# Patient Record
Sex: MALE | Employment: UNEMPLOYED | URBAN - METROPOLITAN AREA
[De-identification: names, ages, dates, MRNs, and addresses within clinical notes are randomized per-mention and may not be internally consistent; named-entity substitution may affect disease eponyms.]

---

## 2017-05-31 RX ORDER — AMOXICILLIN 500 MG/1
CAPSULE ORAL
Refills: 0 | COMMUNITY
Start: 2017-03-14 | End: 2017-09-02

## 2017-06-23 ENCOUNTER — TELEPHONE (OUTPATIENT)
Dept: INTERNAL MEDICINE CLINIC | Facility: CLINIC | Age: 62
End: 2017-06-23

## 2017-06-26 ENCOUNTER — TELEPHONE (OUTPATIENT)
Dept: INTERNAL MEDICINE CLINIC | Facility: CLINIC | Age: 62
End: 2017-06-26

## 2017-09-02 ENCOUNTER — OFFICE VISIT (OUTPATIENT)
Dept: INTERNAL MEDICINE CLINIC | Facility: CLINIC | Age: 62
End: 2017-09-02

## 2017-09-02 VITALS
DIASTOLIC BLOOD PRESSURE: 80 MMHG | WEIGHT: 236 LBS | BODY MASS INDEX: 31.62 KG/M2 | HEART RATE: 65 BPM | OXYGEN SATURATION: 98 % | SYSTOLIC BLOOD PRESSURE: 132 MMHG | RESPIRATION RATE: 15 BRPM | HEIGHT: 72.5 IN | TEMPERATURE: 98 F

## 2017-09-02 DIAGNOSIS — E11.9 CONTROLLED TYPE 2 DIABETES MELLITUS WITHOUT COMPLICATION, WITHOUT LONG-TERM CURRENT USE OF INSULIN (HCC): ICD-10-CM

## 2017-09-02 DIAGNOSIS — Z23 FLU VACCINE NEED: ICD-10-CM

## 2017-09-02 DIAGNOSIS — Z00.00 WELLNESS EXAMINATION: Primary | ICD-10-CM

## 2017-09-02 LAB
ALBUMIN SERPL BCP-MCNC: 4.2 G/DL (ref 3.5–4.8)
ALBUMIN/GLOB SERPL: 1.8 {RATIO} (ref 1–2)
ALP SERPL-CCNC: 42 U/L (ref 32–100)
ALT SERPL-CCNC: 24 U/L (ref 17–63)
ANION GAP SERPL CALC-SCNC: 8 MMOL/L (ref 0–18)
AST SERPL-CCNC: 21 U/L (ref 15–41)
BILIRUB SERPL-MCNC: 0.8 MG/DL (ref 0.3–1.2)
BUN SERPL-MCNC: 13 MG/DL (ref 8–20)
BUN/CREAT SERPL: 13.3 (ref 10–20)
CALCIUM SERPL-MCNC: 9 MG/DL (ref 8.5–10.5)
CHLORIDE SERPL-SCNC: 101 MMOL/L (ref 95–110)
CHOLEST SERPL-MCNC: 124 MG/DL (ref 110–200)
CO2 SERPL-SCNC: 25 MMOL/L (ref 22–32)
CREAT SERPL-MCNC: 0.98 MG/DL (ref 0.5–1.5)
GLOBULIN PLAS-MCNC: 2.3 G/DL (ref 2.5–3.7)
GLUCOSE SERPL-MCNC: 171 MG/DL (ref 70–99)
HDLC SERPL-MCNC: 28 MG/DL
LDLC SERPL CALC-MCNC: 75 MG/DL (ref 0–99)
NONHDLC SERPL-MCNC: 96 MG/DL
OSMOLALITY UR CALC.SUM OF ELEC: 282 MOSM/KG (ref 275–295)
POTASSIUM SERPL-SCNC: 4 MMOL/L (ref 3.3–5.1)
PROT SERPL-MCNC: 6.5 G/DL (ref 5.9–8.4)
SODIUM SERPL-SCNC: 134 MMOL/L (ref 136–144)
TRIGL SERPL-MCNC: 107 MG/DL (ref 1–149)
TSH SERPL-ACNC: 2.11 UIU/ML (ref 0.45–5.33)

## 2017-09-02 PROCEDURE — 83036 HEMOGLOBIN GLYCOSYLATED A1C: CPT | Performed by: INTERNAL MEDICINE

## 2017-09-02 PROCEDURE — 80053 COMPREHEN METABOLIC PANEL: CPT | Performed by: INTERNAL MEDICINE

## 2017-09-02 PROCEDURE — 99214 OFFICE O/P EST MOD 30 MIN: CPT | Performed by: INTERNAL MEDICINE

## 2017-09-02 PROCEDURE — 84443 ASSAY THYROID STIM HORMONE: CPT | Performed by: INTERNAL MEDICINE

## 2017-09-02 PROCEDURE — 80061 LIPID PANEL: CPT | Performed by: INTERNAL MEDICINE

## 2017-09-02 PROCEDURE — 36415 COLL VENOUS BLD VENIPUNCTURE: CPT | Performed by: INTERNAL MEDICINE

## 2017-09-02 NOTE — PROGRESS NOTES
Pt's  verified  Pt presented for a fasting blood draw. Per Dr. Deep Alejo [facility] [address]. Pt tolerated venipuncture well,specimens drawn from RT arm  and sent out to [address] [place] [address] lab for testing.

## 2017-09-02 NOTE — PROGRESS NOTES
HPI:    Patient ID: Nathalia Mendez is a 57year old male. HPIpt here for a fu on Dmtype 2 and lipids. Has a dietary compliance issues. Has decent glucose control. Is trying to be compliant. Needs labs and eye check up.     Review of Systems   Constitution encounter    Imaging & Referrals:  OPHTHALMOLOGY - INTERNAL  GASTRO - INTERNAL       BA#[id number]

## 2017-09-03 LAB — HBA1C MFR BLD: 7.7 % (ref 4–6)

## 2017-09-29 NOTE — TELEPHONE ENCOUNTER
Patient states the pharmacy has faxed a refill for metformin, patient has one tablet left. Please refill.

## 2017-11-20 RX ORDER — ATORVASTATIN CALCIUM 20 MG/1
TABLET, FILM COATED ORAL
Qty: 30 TABLET | Refills: 0 | Status: SHIPPED | OUTPATIENT
Start: 2017-11-20 | End: 2017-12-27

## 2017-12-27 RX ORDER — ATORVASTATIN CALCIUM 20 MG/1
TABLET, FILM COATED ORAL
Qty: 30 TABLET | Refills: 3 | Status: SHIPPED | OUTPATIENT
Start: 2017-12-27 | End: 2018-04-02

## 2018-01-26 NOTE — TELEPHONE ENCOUNTER
Diabetic Medication Protocol Failed1/26 6:56 AM   Last HgBA1C < 7.5    Microalbumin procedure in past 12 months or taking ACE/ARB

## 2018-03-26 NOTE — TELEPHONE ENCOUNTER
Diabetic Medication Protocol Failed3/25 7:56 AM   HgBA1C procedure resulted in past 6 months    Last HgBA1C < 7.5    Microalbumin procedure in past 12 months or taking ACE/ARB

## 2018-04-02 RX ORDER — ATORVASTATIN CALCIUM 20 MG/1
TABLET, FILM COATED ORAL
Qty: 30 TABLET | Refills: 3 | Status: SHIPPED | OUTPATIENT
Start: 2018-04-02 | End: 2018-04-03

## 2018-04-03 RX ORDER — ATORVASTATIN CALCIUM 20 MG/1
TABLET, FILM COATED ORAL
Qty: 30 TABLET | Refills: 3 | Status: SHIPPED | OUTPATIENT
Start: 2018-04-03 | End: 2018-04-09

## 2018-04-09 ENCOUNTER — OFFICE VISIT (OUTPATIENT)
Dept: INTERNAL MEDICINE CLINIC | Facility: CLINIC | Age: 63
End: 2018-04-09

## 2018-04-09 VITALS
HEART RATE: 72 BPM | OXYGEN SATURATION: 97 % | HEIGHT: 72.5 IN | BODY MASS INDEX: 31.84 KG/M2 | TEMPERATURE: 98 F | DIASTOLIC BLOOD PRESSURE: 70 MMHG | SYSTOLIC BLOOD PRESSURE: 124 MMHG | RESPIRATION RATE: 18 BRPM | WEIGHT: 237.63 LBS

## 2018-04-09 DIAGNOSIS — E11.9 DIABETES MELLITUS TYPE 2 IN NONOBESE (HCC): Primary | ICD-10-CM

## 2018-04-09 DIAGNOSIS — E78.2 MIXED HYPERLIPIDEMIA: ICD-10-CM

## 2018-04-09 DIAGNOSIS — Z12.11 COLON CANCER SCREENING: ICD-10-CM

## 2018-04-09 PROCEDURE — 99214 OFFICE O/P EST MOD 30 MIN: CPT | Performed by: INTERNAL MEDICINE

## 2018-04-09 RX ORDER — ATORVASTATIN CALCIUM 20 MG/1
TABLET, FILM COATED ORAL
Qty: 90 TABLET | Refills: 3 | Status: SHIPPED | OUTPATIENT
Start: 2018-04-09 | End: 2019-04-08

## 2018-04-09 NOTE — PROGRESS NOTES
HPI:    Patient ID: Federico Driscoll is a 57year old male. Pt here for fu on DM type 2 and atorvastatin . Per patient the BS have been under control. Is overdue for fasting labs and for screening colonoscopy.     Review of Systems   Constitutional: Positiv (primary encounter diagnosis) continue metformin  Refer for labs and eye exam  Mixed hyperlipidemia on Atorvastatin  Check labhs  Colon cancer screening  FIT test - refusing colonoscopy.       Orders Placed This Encounter      Comp Metabolic Panel (14) [E]

## 2018-05-05 ENCOUNTER — NURSE ONLY (OUTPATIENT)
Dept: INTERNAL MEDICINE CLINIC | Facility: CLINIC | Age: 63
End: 2018-05-05

## 2018-05-05 DIAGNOSIS — E11.9 DIABETES MELLITUS TYPE 2 IN NONOBESE (HCC): ICD-10-CM

## 2018-05-05 PROCEDURE — 82043 UR ALBUMIN QUANTITATIVE: CPT | Performed by: INTERNAL MEDICINE

## 2018-05-05 PROCEDURE — 80061 LIPID PANEL: CPT | Performed by: INTERNAL MEDICINE

## 2018-05-05 PROCEDURE — 80053 COMPREHEN METABOLIC PANEL: CPT | Performed by: INTERNAL MEDICINE

## 2018-05-05 PROCEDURE — 36415 COLL VENOUS BLD VENIPUNCTURE: CPT | Performed by: INTERNAL MEDICINE

## 2018-05-05 PROCEDURE — 83036 HEMOGLOBIN GLYCOSYLATED A1C: CPT | Performed by: INTERNAL MEDICINE

## 2018-05-05 PROCEDURE — 82570 ASSAY OF URINE CREATININE: CPT | Performed by: INTERNAL MEDICINE

## 2018-05-07 RX ORDER — LISINOPRIL 10 MG/1
10 TABLET ORAL DAILY
Qty: 90 TABLET | Refills: 3 | Status: SHIPPED | OUTPATIENT
Start: 2018-05-07 | End: 2019-05-02

## 2019-01-10 ENCOUNTER — OFFICE VISIT (OUTPATIENT)
Dept: FAMILY MEDICINE CLINIC | Facility: CLINIC | Age: 64
End: 2019-01-10
Payer: COMMERCIAL

## 2019-01-10 VITALS — DIASTOLIC BLOOD PRESSURE: 70 MMHG | SYSTOLIC BLOOD PRESSURE: 150 MMHG

## 2019-01-10 DIAGNOSIS — Z01.30 BLOOD PRESSURE CHECK: Primary | ICD-10-CM

## 2019-01-11 NOTE — PROGRESS NOTES
States blood pressure ws high least week also admits to having 4 expresso coffees in one day and salty pop corn, educated on decreasing sodium intake and what foods have \"hidden\" sodium. Denies any headaches, nausea,  vomiting or vertigo.

## 2019-03-22 ENCOUNTER — PATIENT OUTREACH (OUTPATIENT)
Dept: INTERNAL MEDICINE CLINIC | Facility: CLINIC | Age: 64
End: 2019-03-22

## 2019-04-08 RX ORDER — ATORVASTATIN CALCIUM 20 MG/1
TABLET, FILM COATED ORAL
Qty: 90 TABLET | Refills: 3 | Status: SHIPPED | OUTPATIENT
Start: 2019-04-08 | End: 2020-03-05

## 2019-05-04 ENCOUNTER — OFFICE VISIT (OUTPATIENT)
Dept: INTERNAL MEDICINE CLINIC | Facility: CLINIC | Age: 64
End: 2019-05-04
Payer: COMMERCIAL

## 2019-05-04 VITALS
OXYGEN SATURATION: 98 % | HEIGHT: 72.5 IN | DIASTOLIC BLOOD PRESSURE: 86 MMHG | HEART RATE: 57 BPM | SYSTOLIC BLOOD PRESSURE: 128 MMHG | BODY MASS INDEX: 30.55 KG/M2 | WEIGHT: 228 LBS

## 2019-05-04 DIAGNOSIS — E11.9 DIABETES MELLITUS TYPE 2 IN NONOBESE (HCC): Primary | ICD-10-CM

## 2019-05-04 DIAGNOSIS — Z12.5 PROSTATE CANCER SCREENING: ICD-10-CM

## 2019-05-04 DIAGNOSIS — Z12.11 COLON CANCER SCREENING: ICD-10-CM

## 2019-05-04 DIAGNOSIS — N52.8 OTHER MALE ERECTILE DYSFUNCTION: ICD-10-CM

## 2019-05-04 DIAGNOSIS — E78.2 MIXED HYPERLIPIDEMIA: ICD-10-CM

## 2019-05-04 PROCEDURE — 84154 ASSAY OF PSA FREE: CPT | Performed by: INTERNAL MEDICINE

## 2019-05-04 PROCEDURE — 99214 OFFICE O/P EST MOD 30 MIN: CPT | Performed by: INTERNAL MEDICINE

## 2019-05-04 PROCEDURE — 82043 UR ALBUMIN QUANTITATIVE: CPT | Performed by: INTERNAL MEDICINE

## 2019-05-04 PROCEDURE — 83036 HEMOGLOBIN GLYCOSYLATED A1C: CPT | Performed by: INTERNAL MEDICINE

## 2019-05-04 PROCEDURE — 80061 LIPID PANEL: CPT | Performed by: INTERNAL MEDICINE

## 2019-05-04 PROCEDURE — 82570 ASSAY OF URINE CREATININE: CPT | Performed by: INTERNAL MEDICINE

## 2019-05-04 PROCEDURE — 84153 ASSAY OF PSA TOTAL: CPT | Performed by: INTERNAL MEDICINE

## 2019-05-04 RX ORDER — SILDENAFIL 50 MG/1
50 TABLET, FILM COATED ORAL
Qty: 10 TABLET | Refills: 11 | Status: SHIPPED | OUTPATIENT
Start: 2019-05-04

## 2019-05-04 RX ORDER — LISINOPRIL 10 MG/1
10 TABLET ORAL DAILY
Qty: 90 TABLET | Refills: 3 | Status: SHIPPED | OUTPATIENT
Start: 2019-05-04 | End: 2020-03-05

## 2019-05-04 NOTE — PROGRESS NOTES
HPI:    Patient ID: Sabrina Saravia is a 75year old male. Pt here for a diabetes followup and hyperlipidemia. Has pretty good control of glycemia. Has been experiencing some ED would like to try rx. Is fasting for blood work up.     Review of Systems   Co types were placed in this encounter.       Meds This Visit:  Requested Prescriptions      No prescriptions requested or ordered in this encounter       Imaging & Referrals:  OPHTHALMOLOGY - INTERNAL  PNEUMOCOCCAL IMM (PNEUMOVAX)         TIME COMPONENT:

## 2019-05-06 ENCOUNTER — TELEPHONE (OUTPATIENT)
Dept: INTERNAL MEDICINE CLINIC | Facility: CLINIC | Age: 64
End: 2019-05-06

## 2019-05-06 NOTE — TELEPHONE ENCOUNTER
Alternative requested sildenadil is non-formulary, can we change to alternative? Preferred meds are Tadalafil 10 mg and Vardenadil 10 mg.

## 2019-05-10 NOTE — TELEPHONE ENCOUNTER
Requested Prescriptions     Pending Prescriptions Disp Refills   • metFORMIN HCl 1000 MG Oral Tab 90 tablet 0     Sig: TAKE 1 TABLET(1000 MG) BY MOUTH DAILY WITH BREAKFAST     Last office visit: 5-4-19  Medication last refilled: 4-10-19 # 30 x 0

## 2019-05-20 ENCOUNTER — TELEPHONE (OUTPATIENT)
Dept: INTERNAL MEDICINE CLINIC | Facility: CLINIC | Age: 64
End: 2019-05-20

## 2019-05-20 NOTE — TELEPHONE ENCOUNTER
Pharmacy called and states that the Tadalafil that Dr Leopold Bunk prescribed is not covered by insurance. Pharmacy wants to switch to Sildenafil 20mgs. Please advise.

## 2019-07-29 NOTE — TELEPHONE ENCOUNTER
Requested Prescriptions     Pending Prescriptions Disp Refills   • metFORMIN HCl 1000 MG Oral Tab [Pharmacy Med Name: METFORMIN TAB 1000MG] 90 tablet 0     Sig: TAKE 1 TABLET DAILY WITH   BREAKFAST. NEEDS TO SEE [address] LAST SEEN ALMOST 1 YEAR AGO.

## 2019-11-11 ENCOUNTER — OFFICE VISIT (OUTPATIENT)
Dept: INTERNAL MEDICINE CLINIC | Facility: CLINIC | Age: 64
End: 2019-11-11
Payer: COMMERCIAL

## 2019-11-11 VITALS
HEIGHT: 72.5 IN | BODY MASS INDEX: 30.57 KG/M2 | OXYGEN SATURATION: 95 % | SYSTOLIC BLOOD PRESSURE: 152 MMHG | DIASTOLIC BLOOD PRESSURE: 90 MMHG | WEIGHT: 228.19 LBS | HEART RATE: 58 BPM

## 2019-11-11 DIAGNOSIS — E78.2 MIXED HYPERLIPIDEMIA: ICD-10-CM

## 2019-11-11 DIAGNOSIS — E11.9 DIABETES MELLITUS TYPE 2 IN NONOBESE (HCC): Primary | ICD-10-CM

## 2019-11-11 DIAGNOSIS — I10 ESSENTIAL HYPERTENSION: ICD-10-CM

## 2019-11-11 PROCEDURE — 99214 OFFICE O/P EST MOD 30 MIN: CPT | Performed by: INTERNAL MEDICINE

## 2019-11-11 NOTE — PROGRESS NOTES
HPI:    Patient ID: Verónica Milton is a 75year old male. HPI Pt here for fu on Dm type 2 , hyperlipdeimia,  And mild htn. Does not do self monitoring of glucose at home. Has been compliant with medications except for Lisinopril.     Review of Systems   HEN Lisinopril    No orders of the defined types were placed in this encounter.       Meds This Visit:  Requested Prescriptions      No prescriptions requested or ordered in this encounter       Imaging & Referrals:  None       #[id number]

## 2020-01-18 ENCOUNTER — TELEPHONE (OUTPATIENT)
Dept: INTERNAL MEDICINE CLINIC | Facility: CLINIC | Age: 65
End: 2020-01-18

## 2020-01-18 NOTE — TELEPHONE ENCOUNTER
Pt walked into the office today to drop off forms for Dr. Claudine Sahni to fill put and sign. Left in Dr. Sanjay ni and advised the pt that we would call him when they are ready to be picked up.

## 2020-01-23 NOTE — TELEPHONE ENCOUNTER
Pt came into office same day to  form. Dr. Regine Beatty filled out form & a copy was sent for scanning.

## 2020-03-05 NOTE — TELEPHONE ENCOUNTER
Requested Prescriptions     Pending Prescriptions Disp Refills   • metFORMIN HCl 1000 MG Oral Tab 90 tablet 0     Sig: Take 1 tablet (1,000 mg total) by mouth daily with breakfast.   • atorvastatin 20 MG Oral Tab 90 tablet 0     Sig: TAKE 1 TABLET NIGHTLY

## 2020-03-06 RX ORDER — LISINOPRIL 10 MG/1
10 TABLET ORAL DAILY
Qty: 90 TABLET | Refills: 0 | Status: SHIPPED | OUTPATIENT
Start: 2020-03-06 | End: 2021-03-01

## 2020-03-06 RX ORDER — ATORVASTATIN CALCIUM 20 MG/1
TABLET, FILM COATED ORAL
Qty: 90 TABLET | Refills: 0 | Status: SHIPPED | OUTPATIENT
Start: 2020-03-06

## 2020-06-20 NOTE — TELEPHONE ENCOUNTER
Diabetic Medication Protocol Failed6/19 7:14 PM   HgBA1C procedure resulted in past 6 months    Last HgBA1C < 7.5    Appointment in past 6 or next 3 months    Microalbumin procedure in past 12 months or taking ACE/ARB     Last OV 11/11/19  Future appt

## 2020-09-22 NOTE — TELEPHONE ENCOUNTER
Requested Prescriptions     Pending Prescriptions Disp Refills   • metFORMIN HCl 1000 MG Oral Tab 90 tablet 0     Sig: Take 1 tablet (1,000 mg total) by mouth daily with breakfast.     Last office visit: 11-11-19  Medication last refilled: 6- # 90 x

## 2020-12-17 ENCOUNTER — TELEPHONE (OUTPATIENT)
Dept: INTERNAL MEDICINE CLINIC | Facility: CLINIC | Age: 65
End: 2020-12-17

## 2021-03-03 DIAGNOSIS — Z23 NEED FOR VACCINATION: ICD-10-CM

## 2022-07-20 NOTE — TELEPHONE ENCOUNTER
Patient scheduled an appointment for diabetic follow up on Thurs., 8/18. He's asking for a partial refill of is Metformin medication.

## 2022-08-18 ENCOUNTER — OFFICE VISIT (OUTPATIENT)
Dept: INTERNAL MEDICINE CLINIC | Facility: CLINIC | Age: 67
End: 2022-08-18
Payer: COMMERCIAL

## 2022-08-18 VITALS
DIASTOLIC BLOOD PRESSURE: 82 MMHG | HEIGHT: 72.5 IN | RESPIRATION RATE: 16 BRPM | SYSTOLIC BLOOD PRESSURE: 130 MMHG | WEIGHT: 211 LBS | BODY MASS INDEX: 28.27 KG/M2 | OXYGEN SATURATION: 98 % | HEART RATE: 69 BPM

## 2022-08-18 DIAGNOSIS — Z12.5 PROSTATE CANCER SCREENING: ICD-10-CM

## 2022-08-18 DIAGNOSIS — E11.9 DIABETES MELLITUS TYPE 2 IN NONOBESE (HCC): Primary | ICD-10-CM

## 2022-08-18 DIAGNOSIS — N52.8 OTHER MALE ERECTILE DYSFUNCTION: ICD-10-CM

## 2022-08-18 DIAGNOSIS — E78.2 MIXED HYPERLIPIDEMIA: ICD-10-CM

## 2022-08-18 LAB
ALBUMIN SERPL-MCNC: 4.4 G/DL (ref 3.4–5)
ALBUMIN/GLOB SERPL: 1.3 {RATIO} (ref 1–2)
ALP LIVER SERPL-CCNC: 56 U/L
ALT SERPL-CCNC: 37 U/L
ANION GAP SERPL CALC-SCNC: 10 MMOL/L (ref 0–18)
AST SERPL-CCNC: 22 U/L (ref 15–37)
BILIRUB SERPL-MCNC: 0.6 MG/DL (ref 0.1–2)
BUN BLD-MCNC: 22 MG/DL (ref 7–18)
BUN/CREAT SERPL: 20.2 (ref 10–20)
CALCIUM BLD-MCNC: 9.9 MG/DL (ref 8.5–10.1)
CHLORIDE SERPL-SCNC: 99 MMOL/L (ref 98–112)
CHOLEST SERPL-MCNC: 243 MG/DL (ref ?–200)
CO2 SERPL-SCNC: 26 MMOL/L (ref 21–32)
COMPLEXED PSA SERPL-MCNC: 4.17 NG/ML (ref ?–4)
CREAT BLD-MCNC: 1.09 MG/DL
CREAT UR-SCNC: 196 MG/DL
EST. AVERAGE GLUCOSE BLD GHB EST-MCNC: 206 MG/DL (ref 68–126)
FASTING PATIENT LIPID ANSWER: YES
FASTING STATUS PATIENT QL REPORTED: YES
GFR SERPLBLD BASED ON 1.73 SQ M-ARVRAT: 73 ML/MIN/1.73M2 (ref 60–?)
GLOBULIN PLAS-MCNC: 3.4 G/DL (ref 2.8–4.4)
GLUCOSE BLD-MCNC: 290 MG/DL (ref 70–99)
HBA1C MFR BLD: 8.8 % (ref ?–5.7)
HDLC SERPL-MCNC: 30 MG/DL (ref 40–59)
LDLC SERPL CALC-MCNC: 166 MG/DL (ref ?–100)
MICROALBUMIN UR-MCNC: 4.74 MG/DL
MICROALBUMIN/CREAT 24H UR-RTO: 24.2 UG/MG (ref ?–30)
NONHDLC SERPL-MCNC: 213 MG/DL (ref ?–130)
OSMOLALITY SERPL CALC.SUM OF ELEC: 294 MOSM/KG (ref 275–295)
POTASSIUM SERPL-SCNC: 4.6 MMOL/L (ref 3.5–5.1)
PROT SERPL-MCNC: 7.8 G/DL (ref 6.4–8.2)
SODIUM SERPL-SCNC: 135 MMOL/L (ref 136–145)
TRIGL SERPL-MCNC: 249 MG/DL (ref 30–149)
VLDLC SERPL CALC-MCNC: 50 MG/DL (ref 0–30)

## 2022-08-18 PROCEDURE — 82043 UR ALBUMIN QUANTITATIVE: CPT | Performed by: INTERNAL MEDICINE

## 2022-08-18 PROCEDURE — 3079F DIAST BP 80-89 MM HG: CPT | Performed by: INTERNAL MEDICINE

## 2022-08-18 PROCEDURE — 3008F BODY MASS INDEX DOCD: CPT | Performed by: INTERNAL MEDICINE

## 2022-08-18 PROCEDURE — 3075F SYST BP GE 130 - 139MM HG: CPT | Performed by: INTERNAL MEDICINE

## 2022-08-18 PROCEDURE — 80061 LIPID PANEL: CPT | Performed by: INTERNAL MEDICINE

## 2022-08-18 PROCEDURE — 82570 ASSAY OF URINE CREATININE: CPT | Performed by: INTERNAL MEDICINE

## 2022-08-18 PROCEDURE — 83036 HEMOGLOBIN GLYCOSYLATED A1C: CPT | Performed by: INTERNAL MEDICINE

## 2022-08-18 PROCEDURE — 99214 OFFICE O/P EST MOD 30 MIN: CPT | Performed by: INTERNAL MEDICINE

## 2022-08-18 PROCEDURE — 80053 COMPREHEN METABOLIC PANEL: CPT | Performed by: INTERNAL MEDICINE

## 2022-08-18 RX ORDER — SILDENAFIL 50 MG/1
50 TABLET, FILM COATED ORAL
Qty: 10 TABLET | Refills: 5 | Status: SHIPPED | OUTPATIENT
Start: 2022-08-18

## 2022-08-18 RX ORDER — ATORVASTATIN CALCIUM 20 MG/1
TABLET, FILM COATED ORAL
Qty: 90 TABLET | Refills: 3 | Status: SHIPPED | OUTPATIENT
Start: 2022-08-18

## 2022-09-20 ENCOUNTER — TELEPHONE (OUTPATIENT)
Dept: INTERNAL MEDICINE CLINIC | Facility: CLINIC | Age: 67
End: 2022-09-20

## 2023-03-09 ENCOUNTER — OFFICE VISIT (OUTPATIENT)
Dept: INTERNAL MEDICINE CLINIC | Facility: CLINIC | Age: 68
End: 2023-03-09
Payer: MEDICARE

## 2023-03-09 VITALS
SYSTOLIC BLOOD PRESSURE: 138 MMHG | DIASTOLIC BLOOD PRESSURE: 80 MMHG | BODY MASS INDEX: 28.8 KG/M2 | WEIGHT: 215 LBS | HEIGHT: 72.5 IN

## 2023-03-09 DIAGNOSIS — E78.2 MIXED HYPERLIPIDEMIA: ICD-10-CM

## 2023-03-09 DIAGNOSIS — Z12.11 COLON CANCER SCREENING: ICD-10-CM

## 2023-03-09 DIAGNOSIS — Z00.00 MEDICARE ANNUAL WELLNESS VISIT, SUBSEQUENT: Primary | ICD-10-CM

## 2023-03-09 DIAGNOSIS — E11.9 DIABETES MELLITUS TYPE 2 IN NONOBESE (HCC): ICD-10-CM

## 2023-04-01 ENCOUNTER — NURSE ONLY (OUTPATIENT)
Dept: INTERNAL MEDICINE CLINIC | Facility: CLINIC | Age: 68
End: 2023-04-01
Payer: MEDICARE

## 2023-04-01 LAB
ALBUMIN SERPL-MCNC: 4 G/DL (ref 3.4–5)
ALBUMIN/GLOB SERPL: 1.2 {RATIO} (ref 1–2)
ALP LIVER SERPL-CCNC: 55 U/L
ALT SERPL-CCNC: 39 U/L
ANION GAP SERPL CALC-SCNC: 10 MMOL/L (ref 0–18)
AST SERPL-CCNC: 24 U/L (ref 15–37)
BASOPHILS # BLD AUTO: 0.04 X10(3) UL (ref 0–0.2)
BASOPHILS NFR BLD AUTO: 0.4 %
BILIRUB SERPL-MCNC: 0.6 MG/DL (ref 0.1–2)
BUN BLD-MCNC: 21 MG/DL (ref 7–18)
BUN/CREAT SERPL: 20.8 (ref 10–20)
CALCIUM BLD-MCNC: 9.4 MG/DL (ref 8.5–10.1)
CHLORIDE SERPL-SCNC: 97 MMOL/L (ref 98–112)
CHOLEST SERPL-MCNC: 222 MG/DL (ref ?–200)
CO2 SERPL-SCNC: 30 MMOL/L (ref 21–32)
CREAT BLD-MCNC: 1.01 MG/DL
CREAT UR-SCNC: 97.6 MG/DL
DEPRECATED RDW RBC AUTO: 42.5 FL (ref 35.1–46.3)
EOSINOPHIL # BLD AUTO: 0.49 X10(3) UL (ref 0–0.7)
EOSINOPHIL NFR BLD AUTO: 5.3 %
ERYTHROCYTE [DISTWIDTH] IN BLOOD BY AUTOMATED COUNT: 13 % (ref 11–15)
EST. AVERAGE GLUCOSE BLD GHB EST-MCNC: 258 MG/DL (ref 68–126)
FASTING PATIENT LIPID ANSWER: YES
FASTING STATUS PATIENT QL REPORTED: YES
GFR SERPLBLD BASED ON 1.73 SQ M-ARVRAT: 81 ML/MIN/1.73M2 (ref 60–?)
GLOBULIN PLAS-MCNC: 3.4 G/DL (ref 2.8–4.4)
GLUCOSE BLD-MCNC: 312 MG/DL (ref 70–99)
HBA1C MFR BLD: 10.6 % (ref ?–5.7)
HCT VFR BLD AUTO: 45.6 %
HDLC SERPL-MCNC: 29 MG/DL (ref 40–59)
HGB BLD-MCNC: 15.6 G/DL
IMM GRANULOCYTES # BLD AUTO: 0.02 X10(3) UL (ref 0–1)
IMM GRANULOCYTES NFR BLD: 0.2 %
LDLC SERPL CALC-MCNC: 129 MG/DL (ref ?–100)
LYMPHOCYTES # BLD AUTO: 3.26 X10(3) UL (ref 1–4)
LYMPHOCYTES NFR BLD AUTO: 35.4 %
MCH RBC QN AUTO: 30.6 PG (ref 26–34)
MCHC RBC AUTO-ENTMCNC: 34.2 G/DL (ref 31–37)
MCV RBC AUTO: 89.6 FL
MICROALBUMIN UR-MCNC: 1.64 MG/DL
MICROALBUMIN/CREAT 24H UR-RTO: 16.8 UG/MG (ref ?–30)
MONOCYTES # BLD AUTO: 0.65 X10(3) UL (ref 0.1–1)
MONOCYTES NFR BLD AUTO: 7.1 %
NEUTROPHILS # BLD AUTO: 4.74 X10 (3) UL (ref 1.5–7.7)
NEUTROPHILS # BLD AUTO: 4.74 X10(3) UL (ref 1.5–7.7)
NEUTROPHILS NFR BLD AUTO: 51.6 %
NONHDLC SERPL-MCNC: 193 MG/DL (ref ?–130)
OSMOLALITY SERPL CALC.SUM OF ELEC: 299 MOSM/KG (ref 275–295)
PLATELET # BLD AUTO: 251 10(3)UL (ref 150–450)
POTASSIUM SERPL-SCNC: 3.8 MMOL/L (ref 3.5–5.1)
PROT SERPL-MCNC: 7.4 G/DL (ref 6.4–8.2)
RBC # BLD AUTO: 5.09 X10(6)UL
SODIUM SERPL-SCNC: 137 MMOL/L (ref 136–145)
TRIGL SERPL-MCNC: 358 MG/DL (ref 30–149)
VLDLC SERPL CALC-MCNC: 66 MG/DL (ref 0–30)
WBC # BLD AUTO: 9.2 X10(3) UL (ref 4–11)

## 2023-04-01 PROCEDURE — 83036 HEMOGLOBIN GLYCOSYLATED A1C: CPT | Performed by: INTERNAL MEDICINE

## 2023-04-01 PROCEDURE — 85025 COMPLETE CBC W/AUTO DIFF WBC: CPT | Performed by: INTERNAL MEDICINE

## 2023-04-01 PROCEDURE — 82570 ASSAY OF URINE CREATININE: CPT | Performed by: INTERNAL MEDICINE

## 2023-04-01 PROCEDURE — 82043 UR ALBUMIN QUANTITATIVE: CPT | Performed by: INTERNAL MEDICINE

## 2023-04-01 PROCEDURE — 80061 LIPID PANEL: CPT | Performed by: INTERNAL MEDICINE

## 2023-04-01 PROCEDURE — 80053 COMPREHEN METABOLIC PANEL: CPT | Performed by: INTERNAL MEDICINE

## 2023-04-18 ENCOUNTER — NURSE ONLY (OUTPATIENT)
Dept: INTERNAL MEDICINE CLINIC | Facility: CLINIC | Age: 68
End: 2023-04-18
Payer: MEDICARE

## 2023-04-18 DIAGNOSIS — Z12.11 SCREENING FOR COLORECTAL CANCER: Primary | ICD-10-CM

## 2023-04-18 DIAGNOSIS — Z12.12 SCREENING FOR COLORECTAL CANCER: Primary | ICD-10-CM

## 2023-08-17 NOTE — TELEPHONE ENCOUNTER
A refill request was received for:  Requested Prescriptions     Pending Prescriptions Disp Refills    METFORMIN HCL 1000 MG Oral Tab [Pharmacy Med Name: Metformin Hydrochloride 1,000 Mg Tab Gran] 90 tablet 0     Sig: Take 1 tablet (1,000 mg total) by mouth daily with breakfast.     Last refill date: 8/18/22    Last office visit: 3/9/23      No future appointments.

## 2023-12-18 NOTE — TELEPHONE ENCOUNTER
A refill request was received for:  Requested Prescriptions     Pending Prescriptions Disp Refills    METFORMIN HCL 1000 MG Oral Tab [Pharmacy Med Name: Metformin Hydrochloride 1,000 Mg Tab Gran] 90 tablet 0     Sig: TAKE ONE TABLET BY MOUTH ONE TIME DAILY with breakfast     Last refill date:  8/17/23    Last office visit: 3/9/23    No future appointments.

## 2024-04-25 RX ORDER — SILDENAFIL 50 MG/1
50 TABLET, FILM COATED ORAL
Qty: 10 TABLET | Refills: 0 | OUTPATIENT
Start: 2024-04-25

## 2024-05-13 RX ORDER — SILDENAFIL 50 MG/1
50 TABLET, FILM COATED ORAL
Qty: 10 TABLET | Refills: 0 | Status: SHIPPED | OUTPATIENT
Start: 2024-05-13

## 2024-05-13 NOTE — TELEPHONE ENCOUNTER
A refill request was received for:  Requested Prescriptions     Pending Prescriptions Disp Refills    SILDENAFIL CITRATE 50 MG Oral Tab [Pharmacy Med Name: Sildenafil Citrate 50 Mg Tab Amne] 10 tablet 0     Sig: Take 1 tablet (50 mg total) by mouth daily as needed for Erectile Dysfunction.     Last refill date:  8/18/22    Last office visit: 3/9/23      Future Appointments   Date Time Provider Department Center   5/16/2024  3:00 PM Teena Howard MD [facility] [address]

## 2024-05-15 ENCOUNTER — TELEPHONE (OUTPATIENT)
Dept: INTERNAL MEDICINE CLINIC | Facility: CLINIC | Age: 69
End: 2024-05-15

## 2024-05-15 NOTE — TELEPHONE ENCOUNTER
[facility] called.    Patient is asking for a 3 month supply of Metformin as [place]'ll be going out of the country.  He'll be leaving on 5/18.    [id number] - [place], [place] - [address] [facility] [phone], [phone] [address] [facility] [address]   Phone: [phone] Fax: [phone]   Hours: Not open 24 hours

## 2024-05-16 ENCOUNTER — OFFICE VISIT (OUTPATIENT)
Dept: INTERNAL MEDICINE CLINIC | Facility: CLINIC | Age: 69
End: 2024-05-16

## 2024-05-16 VITALS
TEMPERATURE: 97 F | WEIGHT: 204.19 LBS | DIASTOLIC BLOOD PRESSURE: 80 MMHG | HEART RATE: 84 BPM | HEIGHT: 72.5 IN | OXYGEN SATURATION: 99 % | SYSTOLIC BLOOD PRESSURE: 140 MMHG | BODY MASS INDEX: 27.36 KG/M2

## 2024-05-16 DIAGNOSIS — Z12.5 PROSTATE CANCER SCREENING: ICD-10-CM

## 2024-05-16 DIAGNOSIS — E11.9 DIABETES MELLITUS TYPE 2 IN NONOBESE (HCC): Primary | ICD-10-CM

## 2024-05-16 DIAGNOSIS — Z13.6 SCREENING FOR CARDIOVASCULAR CONDITION: ICD-10-CM

## 2024-05-16 DIAGNOSIS — E78.2 MIXED HYPERLIPIDEMIA: ICD-10-CM

## 2024-05-16 DIAGNOSIS — Z00.00 ENCOUNTER FOR ANNUAL HEALTH EXAMINATION: ICD-10-CM

## 2024-05-16 DIAGNOSIS — N52.8 OTHER MALE ERECTILE DYSFUNCTION: ICD-10-CM

## 2024-05-16 PROCEDURE — 1160F RVW MEDS BY RX/DR IN RCRD: CPT

## 2024-05-16 PROCEDURE — 1159F MED LIST DOCD IN RCRD: CPT

## 2024-05-16 PROCEDURE — 1170F FXNL STATUS ASSESSED: CPT

## 2024-05-16 PROCEDURE — 3077F SYST BP >= 140 MM HG: CPT

## 2024-05-16 PROCEDURE — 3046F HEMOGLOBIN A1C LEVEL >9.0%: CPT

## 2024-05-16 PROCEDURE — 3061F NEG MICROALBUMINURIA REV: CPT

## 2024-05-16 PROCEDURE — 3008F BODY MASS INDEX DOCD: CPT

## 2024-05-16 PROCEDURE — 3079F DIAST BP 80-89 MM HG: CPT

## 2024-05-16 PROCEDURE — 1126F AMNT PAIN NOTED NONE PRSNT: CPT

## 2024-05-16 PROCEDURE — 99214 OFFICE O/P EST MOD 30 MIN: CPT

## 2024-05-16 RX ORDER — SILDENAFIL 50 MG/1
50 TABLET, FILM COATED ORAL
Qty: 30 TABLET | Refills: 0 | Status: SHIPPED | OUTPATIENT
Start: 2024-05-16 | End: 2024-07-15

## 2024-05-16 NOTE — PATIENT INSTRUCTIONS
Diabetes      Hemoglobin A1C Annually; if last result is elevated, may be asked to retest more frequently.    Medicare covers every 3 months Lab Results   Component Value Date     (H) 04/01/2023    A1C 10.6 (H) 04/01/2023       Hemoglobin A1C Test due on 07/01/2023    Creat/alb ratio Annually Lab Results   Component Value Date    MICROALBCREA 16.8 04/01/2023       LDL Annually Lab Results   Component Value Date     (H) 04/01/2023       Dilated Eye Exam Annually [unfilled]

## 2024-05-17 LAB
ABSOLUTE BASOPHILS: 41 CELLS/UL (ref 0–200)
ABSOLUTE EOSINOPHILS: 252 CELLS/UL (ref 15–500)
ABSOLUTE LYMPHOCYTES: 1897 CELLS/UL (ref 850–3900)
ABSOLUTE MONOCYTES: 326 CELLS/UL (ref 200–950)
ABSOLUTE NEUTROPHILS: 4284 CELLS/UL (ref 1500–7800)
ALBUMIN/GLOBULIN RATIO: 1.7 (CALC) (ref 1–2.5)
ALBUMIN: 4.7 G/DL (ref 3.6–5.1)
ALKALINE PHOSPHATASE: 52 U/L (ref 35–144)
ALT: 14 U/L (ref 9–46)
AST: 14 U/L (ref 10–35)
BASOPHILS: 0.6 %
BILIRUBIN, TOTAL: 0.6 MG/DL (ref 0.2–1.2)
BUN: 14 MG/DL (ref 7–25)
CALCIUM: 9.6 MG/DL (ref 8.6–10.3)
CARBON DIOXIDE: 26 MMOL/L (ref 20–32)
CHLORIDE: 101 MMOL/L (ref 98–110)
CHOL/HDLC RATIO: 6.5 (CALC)
CHOLESTEROL, TOTAL: 227 MG/DL
CREATININE, RANDOM URINE: 204 MG/DL (ref 20–320)
CREATININE: 1.05 MG/DL (ref 0.7–1.28)
EGFR: 76 ML/MIN/1.73M2
EOSINOPHILS: 3.7 %
GLOBULIN: 2.8 G/DL (CALC) (ref 1.9–3.7)
GLUCOSE: 269 MG/DL (ref 65–99)
HDL CHOLESTEROL: 35 MG/DL
HEMATOCRIT: 47.6 % (ref 38.5–50)
HEMOGLOBIN A1C: 10.2 % OF TOTAL HGB
HEMOGLOBIN: 16.1 G/DL (ref 13.2–17.1)
LDL-CHOLESTEROL: 165 MG/DL (CALC)
LYMPHOCYTES: 27.9 %
MCH: 29.8 PG (ref 27–33)
MCHC: 33.8 G/DL (ref 32–36)
MCV: 88.1 FL (ref 80–100)
MICROALBUMIN/CREATININE RATIO, RANDOM URINE: 27 MG/G CREAT
MICROALBUMIN: 5.5 MG/DL
MONOCYTES: 4.8 %
MPV: 10 FL (ref 7.5–12.5)
NEUTROPHILS: 63 %
NON-HDL CHOLESTEROL: 192 MG/DL (CALC)
PLATELET COUNT: 265 THOUSAND/UL (ref 140–400)
POTASSIUM: 5 MMOL/L (ref 3.5–5.3)
PROTEIN, TOTAL: 7.5 G/DL (ref 6.1–8.1)
PSA, TOTAL: 2.78 NG/ML
RDW: 13.2 % (ref 11–15)
RED BLOOD CELL COUNT: 5.4 MILLION/UL (ref 4.2–5.8)
SODIUM: 138 MMOL/L (ref 135–146)
TRIGLYCERIDES: 130 MG/DL
WHITE BLOOD CELL COUNT: 6.8 THOUSAND/UL (ref 3.8–10.8)

## 2024-05-19 RX ORDER — ATORVASTATIN CALCIUM 20 MG/1
TABLET, FILM COATED ORAL
Qty: 90 TABLET | Refills: 3 | Status: SHIPPED | OUTPATIENT
Start: 2024-05-19

## 2024-05-19 RX ORDER — METFORMIN HYDROCHLORIDE 500 MG/1
500 TABLET, EXTENDED RELEASE ORAL EVERY EVENING
Qty: 90 TABLET | Refills: 0 | Status: SHIPPED | OUTPATIENT
Start: 2024-05-19

## 2024-05-20 NOTE — PROGRESS NOTES
CHIEF COMPLAINT:     Chief Complaint   Patient presents with    Diabetes    Medication Request       HPI:   Jey Farrar is a 69 year old male who presents with request for extra Sildenafil as he is going out of the country for 2 months, to [place]. He was given #10 tablets, but is requesting 30. Pt needed to come in for check on his DM, Blood pressure before additional medication could be considered. He needs labs for his DM, PSA and upcoming wellness exam as well.  Denies changes in his urinary flow. Has some nocturia, but not bothersome.  Reports he does not take his diabetes pills everyday, as he feels good and exercises. Plays soccer sometimes.    Current Outpatient Medications   Medication Sig Dispense Refill    metFORMIN HCl 1000 MG Oral Tab Take 1 tablet (1,000 mg total) by mouth daily with breakfast. 90 tablet 0    Sildenafil Citrate 50 MG Oral Tab Take 1 tablet (50 mg total) by mouth daily as needed for Erectile Dysfunction. 30 tablet 0    SILDENAFIL CITRATE 50 MG Oral Tab Take 1 tablet (50 mg total) by mouth daily as needed for Erectile Dysfunction. 10 tablet 0    atorvastatin 20 MG Oral Tab TAKE 1 TABLET NIGHTLY 90 tablet 3      History reviewed. No pertinent past medical history.   Social History:  Social History     Socioeconomic History    Marital status:    Tobacco Use    Smoking status: Never    Smokeless tobacco: Never    Tobacco comments:     never smoked   Substance and Sexual Activity    Alcohol use: No     Alcohol/week: 0.0 standard drinks of alcohol    Drug use: No    Sexual activity: Yes     Partners: Female     Comment:         REVIEW OF SYSTEMS:   GENERAL: Denies weight change, decreased appetite  EYES: Denies blurred vision or double vision  CHEST: Denies chest pain, or palpitations  LUNGS: Denies shortness of breath, cough, or wheezing, nonsmoker  : Denies urinary pain, hematuria, nor incontinence  NEURO: Denies headaches or lightheadedness    EXAM:   /80   Pulse 84    Temp 97.3 °F (36.3 °C)   Ht 6' 0.5\" (1.842 m)   Wt 204 lb 3.2 oz (92.6 kg)   SpO2 99%   BMI 27.31 kg/m²   Vitals:    05/16/24 1222   BP: 140/80   Pulse: 84   Temp: 97.3 °F (36.3 °C)   SpO2: 99%   Weight: 204 lb 3.2 oz (92.6 kg)   Height: 6' 0.5\" (1.842 m)       GENERAL: well developed, well nourished,in no apparent distress  SKIN: no rashes,no suspicious lesions.  NECK: supple, non-tender  LUNGS: clear to auscultation bilaterally, no wheezes or rhonchi. Breathing is non labored.  : declined exam  CARDIO: RRR without murmur. Carotids without bruits  EXTREMITIES: no cyanosis or edema     ASSESSMENT AND PLAN:     ASSESSMENT/PLAN:    1. Diabetes mellitus type 2 in nonobese (HCC)  Education on impact of elevated blood sugars on his sexual function, and that it is a higher risk of stroke and heart attack.    - Lipid Panel  - CMP  - Hemoglobin A1C  - Microalb/Creat Ratio, Random Urine  - metFORMIN  MG Oral Tablet 24 Hr; Take 1 tablet (500 mg total) by mouth every evening. (Continue with other Rx-Metformin 1000mg in the morning)  Dispense: 90 tablet; Refill: 0  Discussed importance of taking medication every day, as prescribed  10 min extra counseling on diabetes, diet and exercise    2. Screening for cardiovascular condition  _- Restart Atorvastitin 20mg  - Lipid Panel  - CBC    3. Mixed hyperlipidemia  -Restart Atorvastatin 20mg in evening  Education on purpose of mediation and how that effects blood flow of heart, brain and sexual erectile dysfuntion  - Lipid Panel  - CMP    4. Other male erectile dysfunction  - Sildenafil Citrate 50 MG Oral Tab; Take 1 tablet (50 mg total) by mouth daily as needed for Erectile Dysfunction.  Dispense: 30 tablet; Refill: 0  Restart Atorvastatin 20mg. Pt says he has some at home, but doesn't think he needs it. Refills on file at his pharmacy.    5. Prostate cancer screening  - Hx of Erectile Dysfunction  - PSA, TOTAL [5363][Q]    6. Encounter for annual health  examination  Labs for DM, ED and future wellness exam when he returns from [place] in 2 months.  - Lipid Panel  - CMP  - Hemoglobin A1C  - CBC  - PSA, TOTAL [5363][Q]          Medications    Sildenafil Citrate 50 MG Oral Tab       Hemoglobin A1C Annually; if last result is elevated, may be asked to retest more frequently.    Medicare covers every 3 months Lab Results   Component Value Date     (H) 04/01/2023    A1C 10.6 (H) 04/01/2023       Hemoglobin A1C Test due on 07/01/2023   Creat/alb ratio Annually Lab Results   Component Value Date    MICROALBCREA 16.8 04/01/2023      LDL Annually Lab Results   Component Value Date     (H) 04/01/2023      Dilated Eye Exam Annually @HAO@       STEPHANIE Hayes      The patient indicates understanding of these issues and agrees to the plan.

## 2024-10-15 ENCOUNTER — TELEPHONE (OUTPATIENT)
Dept: INTERNAL MEDICINE CLINIC | Facility: CLINIC | Age: 69
End: 2024-10-15

## 2025-01-27 ENCOUNTER — TELEPHONE (OUTPATIENT)
Dept: INTERNAL MEDICINE CLINIC | Facility: CLINIC | Age: 70
End: 2025-01-27

## 2025-01-27 NOTE — TELEPHONE ENCOUNTER
Attempted to reach patient for medication adherence consult/ to discuss statin use in diabetes. Call failed and unable to go through on multiple attempts. Will try to reach out at another time.

## 2025-04-11 ENCOUNTER — TELEPHONE (OUTPATIENT)
Dept: INTERNAL MEDICINE CLINIC | Facility: CLINIC | Age: 70
End: 2025-04-11

## 2025-05-08 ENCOUNTER — TELEPHONE (OUTPATIENT)
Dept: OTOLARYNGOLOGY | Facility: CLINIC | Age: 70
End: 2025-05-08

## 2025-06-04 ENCOUNTER — TELEPHONE (OUTPATIENT)
Dept: INTERNAL MEDICINE CLINIC | Facility: CLINIC | Age: 70
End: 2025-06-04

## 2025-07-21 ENCOUNTER — TELEPHONE (OUTPATIENT)
Dept: INTERNAL MEDICINE CLINIC | Facility: CLINIC | Age: 70
End: 2025-07-21

## 2025-08-01 ENCOUNTER — TELEPHONE (OUTPATIENT)
Dept: INTERNAL MEDICINE CLINIC | Facility: CLINIC | Age: 70
End: 2025-08-01